# Patient Record
Sex: MALE | URBAN - METROPOLITAN AREA
[De-identification: names, ages, dates, MRNs, and addresses within clinical notes are randomized per-mention and may not be internally consistent; named-entity substitution may affect disease eponyms.]

---

## 2021-04-19 ENCOUNTER — IMPORTED ENCOUNTER (OUTPATIENT)
Dept: URBAN - METROPOLITAN AREA CLINIC 9 | Facility: CLINIC | Age: 56
End: 2021-04-19

## 2021-04-19 PROBLEM — H04.123: Noted: 2021-04-19

## 2021-09-15 NOTE — PATIENT DISCUSSION
NELA the different lens options. pt aware of rings/halos at night to trade off w/ having that interm. and near [place] vs the CV that will need OTC w/ anything arms length in but no halos/rings at night.

## 2021-10-16 ASSESSMENT — VISUAL ACUITY
OD_CC: 20/20 SN
OD_CC: 20/20 - SN
OS_CC: 20/20 - SN
OD_SC: 20/60 - SN
OS_SC: 20/60 +2 SN

## 2021-10-16 ASSESSMENT — TONOMETRY
OS_IOP_MMHG: 11
OD_IOP_MMHG: 12

## 2021-11-09 NOTE — PATIENT DISCUSSION
Continue with post-operative drops until completed. Complex Repair And Flap Additional Text (Will Appearing After The Standard Complex Repair Text): The complex repair was not sufficient to completely close the primary defect. The remaining additional defect was repaired with the flap mentioned below.

## 2022-01-17 NOTE — PATIENT DISCUSSION
Indications, risks, benefits and alternatives to YAG capsulotomy discussed with patient. Questions answered.

## 2022-01-17 NOTE — PATIENT DISCUSSION
Recommended lid scrubs application QDAY to relieve irritated lids (due to excessive makeup application) and help with some dry eye symptoms.

## 2022-02-02 NOTE — PROCEDURE NOTE: SURGICAL
<p>Prior to commencing surgery patient identification, surgical procedure, site, and side were confirmed by Dr. Edith Petty. <span>&nbsp; </span>Following topical proparacaine anesthesia, the patient was positioned at the YAG laser, a contact lens coupled to the cornea of the right eye with methylcellulose and an axial posterior capsulotomy performed without complication using 3.1 Mj x 25. Attention was then turned to the left eye and a contact lens coupled to the cornea of the left eye with methylcellulose and an axial posterior capsulotomy performed without complication using 3.2 Mj x 29. One drop of Alphagan was instilled in both eyes and the patient returned to the holding area having tolerated the procedure well and without complication. </p>MR [id number]

## 2022-02-08 NOTE — PATIENT DISCUSSION
Droopy lids with excess lid skin. Likely affecting superior visual field.  Consider referral to occuloplastics specialist.

## 2022-03-30 NOTE — PATIENT DISCUSSION
This visual field clearly demonstrated a minimum of 31% loss of upper field of vision OU, with upper lid skin in repose and elevated by taping of the lid to demonstrate potential correction. This field shows that taping the lids significantly improved this patient's superior field of vision by approximately 30%,OU.

## 2022-06-22 NOTE — PROCEDURE NOTE: CLINICAL
PROCEDURE NOTE: Punctal Plugs, Claribel Jennings ([id number], [id number]) OU. Diagnosis: Dry Eye Syndrome. Prior to treatment, the risks/benefits/alternatives were discussed. The patient wished to proceed with procedure. Temporary collagen plugs were inserted. Patient tolerated procedure well. There were no complications. Post procedure instructions given. [facility]

## 2022-06-22 NOTE — PATIENT DISCUSSION
[facility]  [address]  [place] [address]  Phone: [phone]  Fax: [phone]                  Ray Latham   2017 1:00 PM   Office Visit    Description:  Male : 1931   Provider:  Tamym Finley OD   Department:  [facility]           Reason for Visit     Concerns About Ocular Health     Macular Degeneration     Hypertensive Eye Exam           Diagnoses this Visit        Comments    Nuclear sclerosis, bilateral    -  Primary     Age-related macular degeneration         Exposure keratopathy, left         Superficial punctate keratitis of both eyes         H/O retinal vein occlusion         Refractive error                To Do List           Goals (5 Years of Data)     None      Follow-Up and Disposition     Return if symptoms worsen or fail to improve.      [facility]     [facility] On [facility] Nurse Care Line -  Assistance  Unless otherwise directed by your provider, please contact [facility], our nurse care line that is available for  assistance.     Registered nurses in the [facility] Center provide: appointment scheduling, clinical advisement, health education, and other advisory services.  Call: [phone] (toll free)               Medications           Message regarding Medications     Verify the changes and/or additions to your medication regime listed below are the same as discussed with your clinician today.  If any of these changes or additions are incorrect, please notify your healthcare provider.             Verify that the below list of medications is an accurate representation of the medications you are currently taking.  If none reported, the list may be blank. If incorrect, please contact your healthcare provider. Carry this list with you in case of emergency.           Current Medications     aspirin (BUFFERIN) 325 MG Tab Take 81 mg by mouth once daily.    ciprofloxacin HCl (CIPRO) 250 MG tablet TK 1 T PO Q 24 H    clopidogrel  Discussed punctal plugs with patient and risks including epiphora, foreign body sensation, pyogenic granuloma, and infection, to name a few. (PLAVIX) 75 mg tablet TK 1 T PO QD    clotrimazole (MYCELEX) 10 mg jillian Take 10 mg by mouth 5 (five) times daily.    ergocalciferol (ERGOCALCIFEROL) 50,000 unit Cap TK 1 C PO TWICE A MONTH    metOLazone (ZAROXOLYN) 2.5 MG tablet TK 1 T PO  QD PRN    metoprolol succinate (TOPROL-XL) 25 MG 24 hr tablet TK 1 T PO ONCE A DAY    multivitamin capsule Take 1 capsule by mouth once daily.    mupirocin (BACTROBAN) 2 % ointment Apply topically 3 (three) times daily.    nitroGLYCERIN (NITROSTAT) 0.3 MG SL tablet Place 0.3 mg under the tongue every 5 (five) minutes as needed for Chest pain.    oxybutynin (DITROPAN) 5 MG Tab Take 1 tablet (5 mg total) by mouth 3 (three) times daily as needed.    potassium chloride (KLOR-CON) 10 MEQ TbSR     pravastatin (PRAVACHOL) 40 MG tablet TK 1 T PO  QD    ranitidine (ZANTAC) 150 MG tablet TK 1 T PO BID    spironolactone (ALDACTONE) 25 MG tablet TK 1 T PO ONCE A DAY           Clinical Reference Information           Allergies as of 4/11/2017     No Known Allergies      Immunizations Administered on Date of Encounter - 4/11/2017     None      [facility] Sign-Up     Activating your [facility] account is as easy as 1-2-3!     1) Visit [email or web address], select Sign Up Now, enter this activation code and your date of birth, then select Next.  [email or web address]  Expires: 5/26/2017  3:59 PM      2) Create a username and password to use when you visit [facility] in the future and select a security question in case you lose your password and select Next.    3) Enter your e-mail address and click Sign Up!    Additional Information  If you have questions, please e-mail [email or web address] or call [phone] to talk to our [facility] staff. Remember, [facility] is NOT to be used for urgent needs. For medical emergencies, dial 911.         Language Assistance Services     ATTENTION: Language assistance services are available, free of charge. Please call [phone].      ATENCIÓN: Yessy hendricks,  tiene a fountain disposición servicios gratuitos de asistencia lingüística. Llame al [phone].     LETICIA Ý: N?u b?n nói Ti?ng Vi?t, có các d?ch v? h? tr? ngôn ng? mi?n phí dành cho b?n. G?i s? [phone].         [facility] complies with applicable Federal civil rights laws and does not discriminate on the basis of race, color, national origin, age, disability, or sex.

## 2022-06-22 NOTE — PATIENT DISCUSSION
Patient shown model of Synergy.  Patient informed and instructed to give more time to adapt to distance and near vision.

## 2022-06-22 NOTE — PATIENT DISCUSSION
Patient approved TF simulating LVC goal alber OS.  PO with WLS for possible LVC enhancement OS.  Patient States vision improves with TF at distance OU and OS vs uncorrected vision.  Patient told refractive error is smaller and might not be a candidate due to small refractive error.

## 2022-06-22 NOTE — PATIENT DISCUSSION
Patient informed on neuroadaptation, vicky/va, near focal point/range, increase near lighting, and halos at night.

## 2022-06-28 ENCOUNTER — ESTABLISHED PATIENT (OUTPATIENT)
Dept: URBAN - METROPOLITAN AREA CLINIC 6 | Facility: CLINIC | Age: 57
End: 2022-06-28

## 2022-06-28 DIAGNOSIS — H25.13: ICD-10-CM

## 2022-06-28 DIAGNOSIS — H04.123: ICD-10-CM

## 2022-06-28 PROCEDURE — 92014 COMPRE OPH EXAM EST PT 1/>: CPT

## 2022-06-28 ASSESSMENT — VISUAL ACUITY
OD_SC: 20/40
OS_SC: 20/40

## 2022-06-28 ASSESSMENT — TONOMETRY
OS_IOP_MMHG: 10
OD_IOP_MMHG: 11

## 2022-11-22 NOTE — PATIENT DISCUSSION
Dr. Aleja Craven does not recommend touch up with Lasik, to small at this time. Patient is to cont with Dry eye treatment and Lid Hygiene. Patient understands and agrees with plan.

## 2022-11-22 NOTE — PATIENT DISCUSSION
Discussed use of Alphagan for activities at night, rba's reviewed with patient and she understands and wishes to try.

## 2024-08-02 NOTE — PATIENT DISCUSSION
+topical -van -pmn. [FreeTextEntry1] : Annual Gyn exam -STD sent; Mammogram referral given; Subacute Vaginitis - Aptima sent, treat based on results.; Has appointment with PCP today for annual health screen.    -I spent a total of 35 minutes on the date of the encounter reviewing patient's labs, radiology reports, evaluating, counseling and treating the patient.
